# Patient Record
Sex: MALE | Race: BLACK OR AFRICAN AMERICAN | Employment: FULL TIME | URBAN - METROPOLITAN AREA
[De-identification: names, ages, dates, MRNs, and addresses within clinical notes are randomized per-mention and may not be internally consistent; named-entity substitution may affect disease eponyms.]

---

## 2020-10-02 ENCOUNTER — HOSPITAL ENCOUNTER (OUTPATIENT)
Dept: AUDIOLOGY | Age: 65
Discharge: HOME | End: 2020-10-02
Payer: COMMERCIAL

## 2020-10-02 PROCEDURE — 92552 PURE TONE AUDIOMETRY AIR: CPT | Performed by: AUDIOLOGIST

## 2020-10-02 NOTE — PROGRESS NOTES
The patient came in for an industrial hearing screening for his work. He works for [employer]. Pure tone air screening completed bilaterally. Noise notch noted at 4000 Hz, worse in the right ear. Patient reported wearing foam ear plugs at work. He was instructed to continue using hearing protection. Patient signed OLAMIDE and has a copy of his screening that he will turn in at work. He will return as needed.     Electronically signed by Cb Gold on 10/2/2020 at 4:18 PM

## 2021-01-21 ENCOUNTER — HOSPITAL ENCOUNTER (OUTPATIENT)
Dept: AUDIOLOGY | Age: 66
Discharge: HOME | End: 2021-01-21
Payer: COMMERCIAL

## 2021-01-21 PROCEDURE — 92552 PURE TONE AUDIOMETRY AIR: CPT | Performed by: AUDIOLOGIST

## 2021-01-21 NOTE — PROGRESS NOTES
Patient was here for yearly industrial hearing screening for work. Pure tone air screening completed bilaterally. Noise notch noted at 4000 Hz, worse on the right. No significant change noted from previous hearing screening, 10/2/2020. Patient signed OLAMIDE and has a copy of screening for work. He will return as needed.     Cb Angel CCC-A  [address] [facility] [id number]  Electronically signed by Cb Angel on 1/21/2021 at 11:57 AM

## 2022-01-07 ENCOUNTER — HOSPITAL ENCOUNTER (OUTPATIENT)
Dept: AUDIOLOGY | Age: 67
Discharge: HOME | End: 2022-01-07
Payer: COMMERCIAL

## 2022-01-07 PROCEDURE — 92552 PURE TONE AUDIOMETRY AIR: CPT | Performed by: AUDIOLOGIST

## 2022-01-07 NOTE — PROGRESS NOTES
The patient came in for his annual industrial hearing screening for work. He works for [employer]. Pure tone air screening completed bilaterally. Noise notch noted at 4000 Hz, worse in the right ear. No significant change noted since previous screening. Patient signed OLAMIDE and has a copy of his screening that he will turn in at work. He will return as needed.   Electronically signed by Cb Wright on 1/7/2022 at 3:18 PM

## 2023-01-25 ENCOUNTER — FOLLOWUP TELEPHONE ENCOUNTER (OUTPATIENT)
Dept: AUDIOLOGY | Age: 68
End: 2023-01-25

## 2023-01-25 NOTE — TELEPHONE ENCOUNTER
Patient called and left a voicemail message. Called patient back and spoke to him. He wanted to see if he could get his annual hearing screening. He is now back home in [place] and will see if he can get his hearing screening there. He will call back as needed.      Electronically signed by Cb Kay on 1/25/2023 at 11:53 AM

## 2023-03-31 ENCOUNTER — APPOINTMENT (OUTPATIENT)
Dept: URBAN - METROPOLITAN AREA CLINIC 189 | Age: 68
Setting detail: DERMATOLOGY
End: 2023-03-31

## 2023-03-31 DIAGNOSIS — L60.8 OTHER NAIL DISORDERS: ICD-10-CM

## 2023-03-31 PROCEDURE — 99203 OFFICE O/P NEW LOW 30 MIN: CPT

## 2023-03-31 PROCEDURE — OTHER MIPS QUALITY: OTHER

## 2023-03-31 PROCEDURE — OTHER OBSERVATION: OTHER

## 2023-03-31 PROCEDURE — OTHER TREATMENT REGIMEN: OTHER

## 2023-03-31 PROCEDURE — OTHER OBSERVATION AND MEASURE: OTHER

## 2023-03-31 PROCEDURE — OTHER COUNSELING: OTHER

## 2023-03-31 ASSESSMENT — LOCATION SIMPLE DESCRIPTION DERM
LOCATION SIMPLE: RIGHT HAND
LOCATION SIMPLE: LEFT RING FINGER
LOCATION SIMPLE: LEFT MIDDLE FINGER
LOCATION SIMPLE: LEFT SMALL FINGER
LOCATION SIMPLE: RIGHT MIDDLE FINGER

## 2023-03-31 ASSESSMENT — LOCATION DETAILED DESCRIPTION DERM
LOCATION DETAILED: LEFT RING FINGERNAIL
LOCATION DETAILED: RIGHT MIDDLE FINGERNAIL
LOCATION DETAILED: RIGHT THUMBNAIL
LOCATION DETAILED: LEFT MIDDLE FINGERNAIL
LOCATION DETAILED: LEFT SMALL FINGERNAIL

## 2023-03-31 ASSESSMENT — LOCATION ZONE DERM: LOCATION ZONE: FINGERNAIL

## 2023-03-31 NOTE — PROCEDURE: TREATMENT REGIMEN
Detail Level: Simple
Plan: **Refer to [facility] for nail biopsy asap. Stressed to patient the importance of following through to rule out melanoma. \\nThe thumb is suspicious given his race, age, new onset, width of band, and band not uniform. No More sign though.\\nI also think he should have the biopsy on the 5th finger but will let [facility] evaluate and decide on that.

## 2023-04-27 ENCOUNTER — APPOINTMENT (OUTPATIENT)
Dept: URBAN - METROPOLITAN AREA SURGERY 9 | Age: 68
Setting detail: DERMATOLOGY
End: 2023-04-28

## 2023-04-27 ENCOUNTER — APPOINTMENT (OUTPATIENT)
Dept: URBAN - METROPOLITAN AREA SURGERY 9 | Age: 68
Setting detail: DERMATOLOGY
End: 2023-04-27

## 2023-04-27 DIAGNOSIS — L60.8 OTHER NAIL DISORDERS: ICD-10-CM

## 2023-04-27 PROCEDURE — OTHER OTHER: OTHER

## 2023-04-27 NOTE — PROCEDURE: OTHER
Other (Free Text): Present on left 5th finger, left thumb and right thumb. Due to multiple nails having the pigmentation, feel it is unlikely to be melanoma. Will recheck nails in 6 six months.
Detail Level: Detailed
Render Risk Assessment In Note?: yes
Note Text (......Xxx Chief Complaint.): This diagnosis correlates with the